# Patient Record
Sex: MALE | Race: WHITE | NOT HISPANIC OR LATINO | Employment: FULL TIME | URBAN - NONMETROPOLITAN AREA
[De-identification: names, ages, dates, MRNs, and addresses within clinical notes are randomized per-mention and may not be internally consistent; named-entity substitution may affect disease eponyms.]

---

## 2024-03-21 ENCOUNTER — HOSPITAL ENCOUNTER (EMERGENCY)
Facility: HOSPITAL | Age: 49
Discharge: HOME | End: 2024-03-21
Attending: NURSE PRACTITIONER | Admitting: NURSE PRACTITIONER
Payer: OTHER MISCELLANEOUS

## 2024-03-21 ENCOUNTER — APPOINTMENT (OUTPATIENT)
Dept: GENERAL RADIOLOGY | Facility: HOSPITAL | Age: 49
End: 2024-03-21
Attending: NURSE PRACTITIONER
Payer: OTHER MISCELLANEOUS

## 2024-03-21 VITALS
RESPIRATION RATE: 16 BRPM | BODY MASS INDEX: 32.96 KG/M2 | OXYGEN SATURATION: 97 % | TEMPERATURE: 97.8 F | HEIGHT: 67 IN | SYSTOLIC BLOOD PRESSURE: 137 MMHG | HEART RATE: 66 BPM | DIASTOLIC BLOOD PRESSURE: 86 MMHG | WEIGHT: 210 LBS

## 2024-03-21 DIAGNOSIS — T14.8XXA PUNCTURE WOUND: Primary | ICD-10-CM

## 2024-03-21 DIAGNOSIS — S80.851A: ICD-10-CM

## 2024-03-21 PROCEDURE — 73552 X-RAY EXAM OF FEMUR 2/>: CPT | Mod: RT

## 2024-03-21 PROCEDURE — 250N000009 HC RX 250: Performed by: NURSE PRACTITIONER

## 2024-03-21 PROCEDURE — 10120 INC&RMVL FB SUBQ TISS SMPL: CPT

## 2024-03-21 PROCEDURE — 999N000104 HC STATISTIC NO CHARGE

## 2024-03-21 PROCEDURE — 10120 INC&RMVL FB SUBQ TISS SMPL: CPT | Performed by: NURSE PRACTITIONER

## 2024-03-21 RX ORDER — LIDOCAINE HYDROCHLORIDE 10 MG/ML
10 INJECTION, SOLUTION INFILTRATION; PERINEURAL ONCE
Status: COMPLETED | OUTPATIENT
Start: 2024-03-21 | End: 2024-03-21

## 2024-03-21 RX ADMIN — LIDOCAINE HYDROCHLORIDE 10 ML: 10 INJECTION, SOLUTION INFILTRATION; PERINEURAL at 13:54

## 2024-03-21 ASSESSMENT — ENCOUNTER SYMPTOMS
MYALGIAS: 0
WOUND: 1
JOINT SWELLING: 0

## 2024-03-21 ASSESSMENT — ACTIVITIES OF DAILY LIVING (ADL)
ADLS_ACUITY_SCORE: 33
ADLS_ACUITY_SCORE: 35

## 2024-03-21 NOTE — ED PROVIDER NOTES
"  History     Chief Complaint   Patient presents with    Foreign Body in Skin     HPI  Bobby Hart is a 49 year old male who presents ambulatory to urgent care with concerns of a foreign body in his right leg.  Patient was working on a piece of equipment when he states a small piece of metal flew off and hit him to his right medial thigh.  He denies any significant pain to his leg.  He is ambulating with minimal difficulty.  He does have a puncture wound to his medial leg.  This is a work-related injury.  Tdap up-to-date as of 2023.    Allergies:  Allergies   Allergen Reactions    Codeine        Problem List:    There are no problems to display for this patient.       Past Medical History:    History reviewed. No pertinent past medical history.    Past Surgical History:    History reviewed. No pertinent surgical history.    Family History:    History reviewed. No pertinent family history.    Social History:  Marital Status:   [2]        Medications:    No current outpatient medications on file.        Review of Systems   Musculoskeletal:  Negative for gait problem, joint swelling and myalgias.   Skin:  Positive for wound.   All other systems reviewed and are negative.      Physical Exam   BP: 137/86  Pulse: 66  Temp: 97.8  F (36.6  C)  Resp: 16  Height: 170.2 cm (5' 7\")  Weight: 95.3 kg (210 lb)  SpO2: 97 %      Physical Exam  Vitals and nursing note reviewed.   Constitutional:       General: He is not in acute distress.     Appearance: He is well-developed. He is not diaphoretic.   HENT:      Head: Normocephalic and atraumatic.   Eyes:      Pupils: Pupils are equal, round, and reactive to light.   Cardiovascular:      Rate and Rhythm: Normal rate.   Pulmonary:      Effort: Pulmonary effort is normal.   Musculoskeletal:      Cervical back: Normal range of motion and neck supple.        Legs:       Comments: Small puncture wound to medial thigh. No obvious foreign body visualized. On palpation around the " area, appears to be a slightly firmer linear area around the 3 o'clock position of wound. Bleeding controlled.     Distal pulses intact. Ambulating in the room with minimal difficulty.    Skin:     General: Skin is warm and dry.      Coloration: Skin is not pale.   Neurological:      Mental Status: He is alert and oriented to person, place, and time.         ED Course        Procedures       Results for orders placed or performed during the hospital encounter of 03/21/24 (from the past 24 hour(s))   XR Femur Right 2 Views    Narrative    XR FEMUR RIGHT 2 VIEWS    HISTORY: 49 years Male possible metal foreign body to medial right  thigh;    COMPARISON: None    TECHNIQUE: Right femur 4 views    FINDINGS: Joint spaces are congruent. The femur is intact. There is no  evidence of fracture. No concerning osseous changes are present. No  evidence of radiopaque foreign body.      Impression    IMPRESSION: No evidence of radiopaque foreign body.    DIAN HARRIS MD         SYSTEM ID:  [id number]       Medications   lidocaine 1 % injection 10 mL (10 mLs Intradermal $Given 3/21/24 3325)       Assessments & Plan (with Medical Decision Making)   49-year-old male who presented for evaluation of right leg injury with concerns of a foreign body to his thigh. Puncture wound visualized to medial right thigh. Xray of his femur was reviewed which appeared to show a possible foreign body in the same area of puncture wound, seen only on AP view. Radiologist reading was negative for radiopaque foreign body.    Patient noted feeling discomfort at 3 o'clock position of puncture wound and was agreeable to having a small incision made over this area. Risks and benefits were discussed prior to this. Small superficial incision made and used a small pair of tweezers to feel around in the area. No foreign body appreciated. Discussed all findings including xray results with patient. At this time recommended keeping the wound clean and dry.  Observe for any concerning symptoms such as redness, swelling, increased pain, or discharge and return here for evaluation. If concern for foreign body persists, advised close follow up with primary doctor.     I have reviewed the nursing notes.    I have reviewed the findings, diagnosis, plan and need for follow up with the patient.  This document was prepared using a combination of typing and voice generated software.  While every attempt was made for accuracy, spelling and grammatical errors may exist.       New Prescriptions    No medications on file       Final diagnoses:   Puncture wound   Superficial foreign body of right lower extremity, initial encounter       3/21/2024   [facility]       Mpofu, Shaguftaudence, CNP  03/21/24 2037

## 2024-03-21 NOTE — DISCHARGE INSTRUCTIONS
Keep the wound clean and dry.  Observe for signs of infection such as redness or abnormal discharge and return here for evaluation.    Follow-up with your doctor as needed.    Return to urgent care or emergency room for any worsening or concerning symptoms.

## 2024-03-21 NOTE — ED TRIAGE NOTES
Pt presents with concerns of metal in upper right leg, pt reports incident occurring at work, no active bleeding, denies bleeding/clotting disorders. Workmans comp   Last tdap 2023